# Patient Record
Sex: FEMALE | URBAN - METROPOLITAN AREA
[De-identification: names, ages, dates, MRNs, and addresses within clinical notes are randomized per-mention and may not be internally consistent; named-entity substitution may affect disease eponyms.]

---

## 2024-02-21 ENCOUNTER — APPOINTMENT (RX ONLY)
Dept: URBAN - METROPOLITAN AREA CLINIC 323 | Facility: CLINIC | Age: 29
Setting detail: DERMATOLOGY
End: 2024-02-21

## 2024-02-21 DIAGNOSIS — L91.0 HYPERTROPHIC SCAR: ICD-10-CM

## 2024-02-21 PROCEDURE — 11900 INJECT SKIN LESIONS </W 7: CPT

## 2024-02-21 PROCEDURE — ? INTRALESIONAL KENALOG

## 2024-02-21 PROCEDURE — ? COUNSELING

## 2024-02-21 PROCEDURE — ? FULL BODY SKIN EXAM - DECLINED

## 2024-02-21 ASSESSMENT — LOCATION SIMPLE DESCRIPTION DERM: LOCATION SIMPLE: RIGHT EAR

## 2024-02-21 ASSESSMENT — LOCATION ZONE DERM: LOCATION ZONE: EAR

## 2024-02-21 ASSESSMENT — LOCATION DETAILED DESCRIPTION DERM: LOCATION DETAILED: RIGHT SUPERIOR HELIX

## 2024-02-21 NOTE — PROCEDURE: FULL BODY SKIN EXAM - DECLINED
Instructions: This plan will send the code FBSD to the PM system.  DO NOT or CHANGE the price.
Detail Level: Simple
Price (Do Not Change): 0.00
Body Of Note (Please Add Your Own Text Here): Above the waist exam only

## 2024-02-21 NOTE — PROCEDURE: INTRALESIONAL KENALOG
Expiration Date For Kenalog (Optional): 2/2026
How Many Mls Were Removed From The 80 Mg/Ml (5ml) Vial When Preparing The Injectable Solution?: 0
Concentration Of Kenalog Solution Injected (Mg/Ml): 40.0
Which Kenalog Vial Was Used?: Kenalog 40 mg/ml (10 ml vial)
Medical Necessity Clause: This procedure was medically necessary because the lesions that were treated were:
Bill For Wasted Drug (Kenalog)?: no
Kenalog Preparation: Kenalog
Include Z78.9 (Other Specified Conditions Influencing Health Status) As An Associated Diagnosis?: Yes
Kenalog Type Of Vial: Multiple Dose
Ndc# For Kenalog Only: [id number]
Consent: The risks of atrophy were reviewed with the patient.
Detail Level: Zone
Lot # For Kenalog (Optional): [id number]
Administered By (Optional): Kristy
Total Volume (Ccs): 0.4

## 2024-03-15 ENCOUNTER — APPOINTMENT (RX ONLY)
Dept: URBAN - METROPOLITAN AREA CLINIC 323 | Facility: CLINIC | Age: 29
Setting detail: DERMATOLOGY
End: 2024-03-15

## 2024-03-15 DIAGNOSIS — L91.0 HYPERTROPHIC SCAR: ICD-10-CM | Status: INADEQUATELY CONTROLLED

## 2024-03-15 PROCEDURE — ? INTRALESIONAL KENALOG

## 2024-03-15 PROCEDURE — 11900 INJECT SKIN LESIONS </W 7: CPT

## 2024-03-15 PROCEDURE — ? FULL BODY SKIN EXAM - DECLINED

## 2024-03-15 PROCEDURE — ? COUNSELING

## 2024-03-15 ASSESSMENT — LOCATION ZONE DERM: LOCATION ZONE: EAR

## 2024-03-15 ASSESSMENT — SCAR ASSESSEMENT OVERALL: SCAR ASSESSMENT: 2.5 (RAISED UNIFORM SCAR, ERYTHEMA)

## 2024-03-15 ASSESSMENT — LOCATION DETAILED DESCRIPTION DERM: LOCATION DETAILED: RIGHT SUPERIOR HELIX

## 2024-03-15 ASSESSMENT — LOCATION SIMPLE DESCRIPTION DERM: LOCATION SIMPLE: RIGHT EAR

## 2024-03-15 NOTE — PROCEDURE: INTRALESIONAL KENALOG
Treatment Number (Optional): 2
How Many Mls Were Removed From The 40 Mg/Ml (1ml) Vial When Preparing The Injectable Solution?: 0
Kenalog Preparation: Kenalog
Administered By (Optional): Alejandro Marshall
Medical Necessity Clause: This procedure was medically necessary because the lesions that were treated were:
Which Kenalog Vial Was Used?: Kenalog 10 mg/ml (5 ml vial)
Concentration Of Kenalog Solution Injected (Mg/Ml): 40.0
Kenalog Type Of Vial: Multiple Dose
Consent: The risks of atrophy were reviewed with the patient. Patient was informed that repeat injection can be performed, at the soonest, every 6 weeks.
Include Z78.9 (Other Specified Conditions Influencing Health Status) As An Associated Diagnosis?: Yes
Detail Level: Detailed
Ndc# For Kenalog Only: 7515-0141-34
Require Ndc Code?: No
Total Volume (Ccs): 0.15

## 2024-09-17 ENCOUNTER — APPOINTMENT (RX ONLY)
Dept: URBAN - METROPOLITAN AREA CLINIC 323 | Facility: CLINIC | Age: 29
Setting detail: DERMATOLOGY
End: 2024-09-17

## 2024-09-17 DIAGNOSIS — L91.0 HYPERTROPHIC SCAR: ICD-10-CM

## 2024-09-17 PROCEDURE — ? FULL BODY SKIN EXAM - DECLINED

## 2024-09-17 PROCEDURE — ? INTRALESIONAL KENALOG

## 2024-09-17 PROCEDURE — 11900 INJECT SKIN LESIONS </W 7: CPT

## 2024-09-17 PROCEDURE — ? COUNSELING

## 2024-09-17 ASSESSMENT — LOCATION SIMPLE DESCRIPTION DERM: LOCATION SIMPLE: RIGHT EAR

## 2024-09-17 ASSESSMENT — LOCATION ZONE DERM: LOCATION ZONE: EAR

## 2024-09-17 ASSESSMENT — LOCATION DETAILED DESCRIPTION DERM: LOCATION DETAILED: RIGHT SUPERIOR HELIX

## 2024-09-17 NOTE — PROCEDURE: INTRALESIONAL KENALOG
Treatment Number (Optional): 3
How Many Mls Were Removed From The 40 Mg/Ml (1ml) Vial When Preparing The Injectable Solution?: 0
Lot # For Kenalog (Optional): [id number]
Kenalog Preparation: Kenalog
Administered By (Optional): DEREJE
Medical Necessity Clause: This procedure was medically necessary because the lesions that were treated were:
Which Kenalog Vial Was Used?: Kenalog 40 mg/ml (5 ml vial)
Concentration Of Kenalog Solution Injected (Mg/Ml): 40.0
Kenalog Type Of Vial: Multiple Dose
Consent: The risks of atrophy were reviewed with the patient. Patient was informed that repeat injection can be performed, at the soonest, every 6 weeks.
Include Z78.9 (Other Specified Conditions Influencing Health Status) As An Associated Diagnosis?: Yes
Expiration Date For Kenalog (Optional): 08/2025
Detail Level: Detailed
Ndc# For Kenalog Only: 8749-6770-25
Require Ndc Code?: No
Total Volume (Ccs): 0.3

## 2025-01-22 NOTE — PROCEDURE: FULL BODY SKIN EXAM - DECLINED
Detail Level: Simple
Instructions: This plan will send the code FBSD to the PM system.  DO NOT or CHANGE the price.
Price (Do Not Change): 0.00
Detail Level: Detailed
Size Of Lesion: 0.3 cm x 0.4 cm